# Patient Record
Sex: FEMALE | URBAN - METROPOLITAN AREA
[De-identification: names, ages, dates, MRNs, and addresses within clinical notes are randomized per-mention and may not be internally consistent; named-entity substitution may affect disease eponyms.]

---

## 2021-01-14 ENCOUNTER — APPOINTMENT (OUTPATIENT)
Dept: URBAN - METROPOLITAN AREA CLINIC 260 | Age: 51
Setting detail: DERMATOLOGY
End: 2021-01-15

## 2021-01-14 VITALS — WEIGHT: 148 LBS | RESPIRATION RATE: 15 BRPM | HEIGHT: 63 IN

## 2021-01-14 DIAGNOSIS — L81.1 CHLOASMA: ICD-10-CM

## 2021-01-14 PROCEDURE — 99202 OFFICE O/P NEW SF 15 MIN: CPT

## 2021-01-14 PROCEDURE — OTHER PRESCRIPTION: OTHER

## 2021-01-14 PROCEDURE — OTHER PRESCRIPTION MEDICATION MANAGEMENT: OTHER

## 2021-01-14 PROCEDURE — OTHER COUNSELING: OTHER

## 2021-01-14 RX ORDER — TRETIONIN 1 MG/G
CREAM TOPICAL QHS
Qty: 2 | Refills: 0 | COMMUNITY
Start: 2021-01-14

## 2021-01-14 RX ORDER — TRETINOIN 1 MG/G
0.1 % CREAM TOPICAL QHS
Qty: 2 | Refills: 0 | COMMUNITY
Start: 2021-01-14

## 2021-01-14 RX ORDER — HYDROQUINONE 4 %
4 % CREAM (GRAM) TOPICAL QAM
Qty: 2 | Refills: 0 | COMMUNITY
Start: 2021-01-14

## 2021-01-14 ASSESSMENT — LOCATION DETAILED DESCRIPTION DERM
LOCATION DETAILED: LEFT INFERIOR MEDIAL MALAR CHEEK
LOCATION DETAILED: LEFT INFERIOR CENTRAL MALAR CHEEK

## 2021-01-14 ASSESSMENT — LOCATION ZONE DERM: LOCATION ZONE: FACE

## 2021-01-14 ASSESSMENT — LOCATION SIMPLE DESCRIPTION DERM: LOCATION SIMPLE: LEFT CHEEK

## 2021-01-14 NOTE — PROCEDURE: COUNSELING
Patient Specific Counseling (Will Not Stick From Patient To Patient): She prefers the brand name of the medication tretinoin however she?s not sure if she can afford it. We will send both in as well as hydroquinone cream
Detail Level: Zone

## 2021-01-14 NOTE — PROCEDURE: PRESCRIPTION MEDICATION MANAGEMENT
Initiate Treatment: Hydroquinone cream qd\\nRetin a qd
Detail Level: Zone
Continue Regimen: Sunscreen
Render In Strict Bullet Format?: No

## 2021-07-23 ENCOUNTER — HOSPITAL ENCOUNTER (EMERGENCY)
Facility: CLINIC | Age: 51
Discharge: HOME | End: 2021-07-23
Attending: EMERGENCY MEDICINE | Admitting: EMERGENCY MEDICINE
Payer: COMMERCIAL

## 2021-07-23 VITALS
DIASTOLIC BLOOD PRESSURE: 55 MMHG | WEIGHT: 150 LBS | HEART RATE: 78 BPM | TEMPERATURE: 97.8 F | OXYGEN SATURATION: 100 % | SYSTOLIC BLOOD PRESSURE: 101 MMHG | RESPIRATION RATE: 18 BRPM

## 2021-07-23 DIAGNOSIS — L30.9 DERMATITIS: ICD-10-CM

## 2021-07-23 PROCEDURE — 250N000012 HC RX MED GY IP 250 OP 636 PS 637: Performed by: PHYSICIAN ASSISTANT

## 2021-07-23 PROCEDURE — 99283 EMERGENCY DEPT VISIT LOW MDM: CPT

## 2021-07-23 RX ORDER — PREDNISONE 20 MG/1
40 TABLET ORAL ONCE
Status: COMPLETED | OUTPATIENT
Start: 2021-07-23 | End: 2021-07-23

## 2021-07-23 RX ORDER — PREDNISONE 10 MG/1
TABLET ORAL
Qty: 30 TABLET | Refills: 0 | Status: SHIPPED | OUTPATIENT
Start: 2021-07-23 | End: 2021-08-04

## 2021-07-23 RX ADMIN — PREDNISONE 40 MG: 20 TABLET ORAL at 14:50

## 2021-07-23 ASSESSMENT — ENCOUNTER SYMPTOMS
NECK PAIN: 0
SORE THROAT: 0
SHORTNESS OF BREATH: 0
FREQUENCY: 0
NAUSEA: 0
CHILLS: 0
HEMATURIA: 0
FEVER: 0
WOUND: 0
COUGH: 0
BACK PAIN: 0
CHEST TIGHTNESS: 0
EYE DISCHARGE: 0
WEAKNESS: 0
VOMITING: 0
FACIAL SWELLING: 1
ABDOMINAL PAIN: 0
VOICE CHANGE: 1
TROUBLE SWALLOWING: 0
HEADACHES: 0
NUMBNESS: 0
DYSURIA: 0
DIARRHEA: 0

## 2021-07-23 NOTE — ED PROVIDER NOTES
EMERGENCY DEPARTMENT ENCOUNTER      NAME: Ilana Bishop  AGE: 50 year old female  YOB: 1970  MRN: [id number]  EVALUATION DATE & TIME: 7/23/2021  2:14 PM    PCP: No primary care provider on file.    ED PROVIDER: Missael Guerra PA-C      Chief Complaint   Patient presents with     Allergic Reaction         FINAL IMPRESSION:  1. Dermatitis          MEDICAL DECISION MAKING:    Pertinent Labs & Imaging studies reviewed. (See chart for details)  50 year old female presents to the Emergency Department for evaluation of complaints of faint rash on the face and pruritus noted on the face and hands, concerned about allergic reaction.    After obtaining history present illness, reviewing vital signs and examining the patient there is no current reports of lip swelling, tongue swelling, difficulty swallowing or difficulty breathing.  No generalized hives.  Nothing clinically that would support concern for acute anaphylaxis.  Patient is a nondiabetic.  I plan to treat her with a taper dose of prednisone and make a referral as an outpatient to our allergy clinics where she can have testing done to see what she is reacting to.  Overall patient comfortable with plan of care.  I did not feel there is any emergent indication for any continued monitoring or additional testing at this time.        ED COURSE  2:27 PM I met with the patient, obtained history, performed an initial exam, and discussed options and plan for diagnostics and treatment here in the ED. I discussed the plan for discharge with the patient, and patient is agreeable. We discussed supportive cares at home and reasons for return to the ER including new or worsening symptoms - all questions and concerns addressed. Patient to be discharged by RN.     At the conclusion of the encounter I discussed the results of all of the tests and the disposition. The questions were answered. The patient or family acknowledged understanding and was agreeable with the  care plan.     PPE: Provider wore gloves, N95 mask, eye protection, surgical cap, and paper mask.     MEDICATIONS GIVEN IN THE EMERGENCY:  Medications   predniSONE (DELTASONE) tablet 40 mg (40 mg Oral Given 7/23/21 1450)       NEW PRESCRIPTIONS STARTED AT TODAY'S ER VISIT  New Prescriptions    PREDNISONE (DELTASONE) 10 MG TABLET    Take 4 tablets (40 mg) by mouth daily for 3 days, THEN 3 tablets (30 mg) daily for 3 days, THEN 2 tablets (20 mg) daily for 3 days, THEN 1 tablet (10 mg) daily for 3 days.       =================================================================    HPI    Patient information was obtained from: Patient    Use of Interpretor: N/A         Ilana Bishop is a 50 year old female with no recorded pertinent medical history who presents to this ED via walk-in for evaluation of a rash and potential allergic reaction.     The patient reports facial swelling and a mild rash throughout her face, hands, and upper back to her neck that onset yesterday and has continued to worsen. She describes the rash as itchy and has been treating it with benadryl cream without relief. She also endorses a raspy voice but denies swelling of her throat. Patient notes that she used to have similar symptoms during the summer while living in [place] and states that she got a steroid injection that resolved her symptoms. Patient denies shortness of breath and any other symptoms or complaints at this time.       REVIEW OF SYSTEMS   Review of Systems   Constitutional: Negative for chills and fever.   HENT: Positive for facial swelling and voice change (raspy). Negative for congestion, sore throat and trouble swallowing.    Eyes: Negative for discharge and visual disturbance.   Respiratory: Negative for cough, chest tightness and shortness of breath.    Cardiovascular: Negative for chest pain and leg swelling.   Gastrointestinal: Negative for abdominal pain, diarrhea, nausea and vomiting.   Genitourinary: Negative for  dysuria, frequency, hematuria and urgency.   Musculoskeletal: Negative for back pain, gait problem and neck pain.   Skin: Positive for rash (face, hands, back; itchy). Negative for wound.   Neurological: Negative for weakness, numbness and headaches.   Psychiatric/Behavioral: Negative for behavioral problems and suicidal ideas.       PAST MEDICAL HISTORY:  No past medical history on file.    PAST SURGICAL HISTORY:  No past surgical history on file.      CURRENT MEDICATIONS:    No current facility-administered medications for this encounter.    Current Outpatient Medications:      predniSONE (DELTASONE) 10 MG tablet, Take 4 tablets (40 mg) by mouth daily for 3 days, THEN 3 tablets (30 mg) daily for 3 days, THEN 2 tablets (20 mg) daily for 3 days, THEN 1 tablet (10 mg) daily for 3 days., Disp: 30 tablet, Rfl: 0      ALLERGIES:  No Known Allergies    FAMILY HISTORY:  No family history on file.    SOCIAL HISTORY:   Social History     Socioeconomic History     Marital status: Not on file     Spouse name: Not on file     Number of children: Not on file     Years of education: Not on file     Highest education level: Not on file   Occupational History     Not on file   Tobacco Use     Smoking status: Not on file   Substance and Sexual Activity     Alcohol use: Not on file     Drug use: Not on file     Sexual activity: Not on file   Other Topics Concern     Not on file   Social History Narrative     Not on file     Social Determinants of Health     Financial Resource Strain:      Difficulty of Paying Living Expenses:    Food Insecurity:      Worried About Running Out of Food in the Last Year:      Ran Out of Food in the Last Year:    Transportation Needs:      Lack of Transportation (Medical):      Lack of Transportation (Non-Medical):    Physical Activity:      Days of Exercise per Week:      Minutes of Exercise per Session:    Stress:      Feeling of Stress :    Social Connections:      Frequency of Communication with  Friends and Family:      Frequency of Social Gatherings with Friends and Family:      Attends Congregation Services:      Active Member of Clubs or Organizations:      Attends Club or Organization Meetings:      Marital Status:    Intimate Partner Violence:      Fear of Current or Ex-Partner:      Emotionally Abused:      Physically Abused:      Sexually Abused:        VITALS:  Patient Vitals for the past 24 hrs:   BP Temp Temp src Pulse Resp SpO2 Weight   07/23/21 1446 101/55 -- -- 78 -- 100 % --   07/23/21 1445 -- -- -- 76 -- 100 % --   07/23/21 1430 112/61 -- -- 80 -- 97 % --   07/23/21 1413 110/61 97.8  F (36.6  C) Oral 86 18 100 % 68 kg (150 lb)       PHYSICAL EXAM    Physical Exam  Vitals and nursing note reviewed.   Constitutional:       General: She is not in acute distress.     Appearance: Normal appearance. She is not ill-appearing or toxic-appearing.   HENT:      Head: Normocephalic and atraumatic.      Right Ear: External ear normal.      Left Ear: External ear normal.      Nose: Nose normal. No congestion or rhinorrhea.      Mouth/Throat:      Mouth: Mucous membranes are moist.      Pharynx: Oropharynx is clear. No oropharyngeal exudate or posterior oropharyngeal erythema.      Comments: Uvula is midline, no visible swelling of the tongue or posterior pharynx  Eyes:      General: No scleral icterus.        Right eye: No discharge.         Left eye: No discharge.      Extraocular Movements: Extraocular movements intact.      Conjunctiva/sclera: Conjunctivae normal.   Cardiovascular:      Rate and Rhythm: Normal rate and regular rhythm.      Pulses: Normal pulses.      Heart sounds: Normal heart sounds. No murmur heard.     Pulmonary:      Effort: Pulmonary effort is normal. No respiratory distress.      Breath sounds: Normal breath sounds. No stridor. No wheezing, rhonchi or rales.   Chest:      Chest wall: No tenderness.   Abdominal:      General: Abdomen is flat. Bowel sounds are normal.      Palpations:  Abdomen is soft.   Musculoskeletal:         General: No swelling, tenderness, deformity or signs of injury. Normal range of motion.      Cervical back: Normal range of motion and neck supple. No rigidity or tenderness.   Lymphadenopathy:      Cervical: No cervical adenopathy.   Skin:     General: Skin is warm and dry.      Findings: Rash present. No bruising or erythema.      Comments: Scattered rash on face and hands that is pruritic in nature slightly erythematous.  No induration, no tenderness, no warmth.  No vesicles.   Neurological:      General: No focal deficit present.      Mental Status: She is alert and oriented to person, place, and time. Mental status is at baseline.      Cranial Nerves: No cranial nerve deficit.      Sensory: No sensory deficit.      Motor: No weakness.      Gait: Gait normal.   Psychiatric:         Mood and Affect: Mood normal.         Behavior: Behavior normal.         Judgment: Judgment normal.            I, Domi Arechiga, am serving as a scribe to document services personally performed by Missael Guerra PA-C based on my observation and the provider's statements to me. I, Missael Guerra PA-C attest that Domi Arechiga is acting in a scribe capacity, has observed my performance of the services and has documented them in accordance with my direction.    Missael Guerra PA-C  Emergency Medicine  [facility]      Missael Guerra PA-C  07/23/21 1517

## 2021-07-23 NOTE — ED TRIAGE NOTES
"Pt presents to the ED with c/o rash and possible allergic reaction that started around 1800 last night. Pt took benadryl without relief. Today pt says rash seems to mostly be on face/ hands. Unknown source of allergen. Denies any difficulty breathing. Pt states her throat feels more \"scratchy\"  today.   "

## 2021-07-23 NOTE — DISCHARGE INSTRUCTIONS
Please take steroids as written.  Follow-up as an outpatient through allergy clinic.  Return to the ED if there is new or worsening symptoms.

## 2023-01-11 ENCOUNTER — PATIENT OUTREACH (OUTPATIENT)
Dept: ONCOLOGY | Facility: CLINIC | Age: 53
End: 2023-01-11
Payer: COMMERCIAL

## 2023-01-11 ENCOUNTER — TRANSCRIBE ORDERS (OUTPATIENT)
Dept: OTHER | Age: 53
End: 2023-01-11

## 2023-01-11 DIAGNOSIS — C50.919 BREAST CANCER (H): Primary | ICD-10-CM

## 2023-01-11 NOTE — PROGRESS NOTES
New Patient Oncology Nurse Navigator Note     Referring provider: Dr Javier Renteria      Referring Clinic/Organization: [facility]     Referred to (specialty:) Medical Oncology      Date Referral Received: January 11, 2023     Evaluation for:  Breast cancer     Clinical History (per Nurse review of records provided):       Ilana had a screening mammogram on 04/26/2022 showed negative. Patient started to feel some left breast pain/discomfort and she reported that her primary care physician who ordered another mammogram done on 11/04/2022 which demonstrated scattered areas of fibro glandular density without any masses however targeted ultrasound in the area of palpable concern as indicated by the patient at the 3 o'clock left breast 10 cm from the nipple demonstrates an suspicious irregular, hypoechoic mass measuring 1.5 x 1.2 x 1.3 cm. This was not visualized on the mammogram likely due to its far lateral and posterior position.     11/4/22 -   A) LEFT BREAST, 3:00, 10 CM FROM NIPPLE, ULTRASOUND-GUIDED CORE BIOPSY:  1. Invasive ductal carcinoma  a. Karlos grade: III of III; Wayne score: 9 of 9  b. Angio-lymphatic invasion: Absent  c. Associated DCIS: Present, focally  d. Subtype: Solid  e. Grade of DCIS: 3 of 3  2. Breast Ancillary Testing:  a. Hormone Receptors:  Estrogen receptor: Positive (66%, weak staining)  Progesterone receptor: Negative  b. HER2 by IHC: Negative (0 by manual morphometry)  c. Ki-67: 44% by image analysis    Patient underwent left partial mastectomy by Dr. Villar on 12/16/2022 at [facility].   12/16/22 -   A.  Breast, left, Left Breast tissue short stitch superior long stitch lateral, fresh for margins, has seed, partial mastectomy:    Infiltrating ductal carcinoma, Wayne grade III, 1.5 x 1.5 by 1.4 cm in greatest dimension.    Ductal carcinoma in Situ (DCIS), solid, high nuclear grade.    Previous biopsy site changes, excised    Margins negative but close  posterior (less than 1 mm from invasive tumor)    See tumor synoptic report below     B.  Lymph nodes, sentinel, Left Axillary [place] Lymph Node, biopsy:    Three lymph nodes negative for metastatic tumor (0/3).     C.  Breast, left, New Left Breast Posterior Margin stitch marks new margin, re-excision:    Negative for malignancy, skeletal muscle, connective tissue and adipose     D.  Breast, left, New Left Breast Superior Margin stitch marks new margin, re-excision:    Negative for atypia and malignancy.      Focal proliferative fibrocystic change     E.  Breast, left, New Left Breast Medial Margin stitch marks new margin, reexcision:    Negative for atypia and malignancy     F.  Breast, left, New Left Breast Lateral Margin stitch marks new margin, re-excision:    Negative for atypia and malignancy     G.  Breast, left, New Left Breast Anterior Margin, re-excision:  Skin and soft tissue negative for atypia and malignancy    Malignant neoplasm of left breast in female, estrogen receptor positive, unspecified site of breast (HRC)  Microscopic Description    Hormone Receptor Analysis by Immunohistochemistry    Estrogen Receptor (ER) Status: Negative (<1%)           Internal control cells present and stain as expected   Progesterone Receptor (IN): Negative (<1%)           Internal control cells present and stain as expected   HER2 by Immunohistochemistry  INTERPRETATION: Negative (score: 1+)  Specimen Block Number: A6    Note discrepancy on hormone receptors from core biopsy to surgery specimen.      She was seen on 12/29/22 at [facility] by Javier Renteria MD. At that time the plan was for [facility] to obtain core biopsy slides from [facility] for pathology review and for both core and surgery specimen slides to be sent to [facility] for review.      IT IS UNKNOWN WHERE SLIDES ARE AT TIME OF THIS REFERRAL.     Patient was originally planning on [place] for a second opinion, but her insurance did not cover [place], so  referral to [facility] placed today.    Records Location: Care Everywhere, Media and See Bookmarked material     Records Needed:   Breast imaging for past 5 years  Pathology review     1/11 1305 - Telephoned and spoke with Ilana. She would like to be seen by a breast expert at the [facility] but does not want to be seen until after the pathology review is complete to help clarify the discrepancy between core and surgical specimen estrogen receptors.  Writer explained the specimen will need to be requested from [facility] once it has returned from [facility], or [place], depending upon where [facility] sent it after they learned her insurance was not covered there. We scheduled her to see Dr. Alonso on 1/24, but writer to watch for path review to be complete and move consult if we do not have the final path review completed.

## 2023-01-18 NOTE — TELEPHONE ENCOUNTER
RECORDS STATUS - BREAST    Action    Action Taken 23  URGENTLY REQUESTED SLIDES FROM [facility] WITH SPECIFIC INSTRUCTIONS TO HAVE SLIDES SENT DIRECTLY TO OUR PATH LAB    Spoke w/ [facility] Radiology File Room ph: [phone], fax: [phone] confirmed they have imaging for pt, faxed urgent request. They advised they would do their best to expedite sending disc.    -Spoke w/ Shannon @ [facility] Pathology, confirmed receipt of request. Advised slides were on site. Requested if slides could be expedited, Shannon advised that the slides should go out today.    LVM @ [facility] re: requesting slides be expedited.     IB to Aysha SHERWOOD Re: above  9:03 AM    23  Imaging disc received from [facility], taken for upload.     Email sent to  PACS team requesting resolution of breast imaging.     11:40 AM    Pathology slides from [facility] were sent together from [facility], as they had them for a 2nd read.     Spoke / [facility] Consult Office: [phone] opt 2 - confirmed receipt of both sets of slides as received & that they were currently with their pathologist. IB to Aysha to update.   12:18 PM       [facility]   DATE REQUESTED:    NOTES DETAILS STATUS   OFFICE NOTE from referring provider DAHLIA - KEL Renteria   OFFICE NOTE from medical oncologist CE -  Dr. Renteria: 22   OFFICE NOTE from surgeon DAHLIA -  Dr. Roxanna Villar: 22   OPERATIVE REPORT  -  22: Mastectomy   LABS     PATHOLOGY REPORTS  (Tissue diagnosis, Stage, ER/NJ percentage positive and intensity of staining, HER2 IHC, FISH, and all biopsies from breast and any distant metastasis)                 [facility] [employer]    [facility] [employer] [facility]  22: [id number]    [facility]  22: [id number]   IMAGING (NEED IMAGES & REPORT)  [facility] [employer]   MRI Requested  - received  22:    MAMMO Requested  1/18 - received  [facility]  12/16/22    [facility]  11/4/22, 4/26/22    [facility]  12/14/18, 3/5/15   ULTRASOUND Requested 1/18 - received  [facility]  11/4/22

## 2023-01-19 ENCOUNTER — LAB REQUISITION (OUTPATIENT)
Dept: LAB | Facility: CLINIC | Age: 53
End: 2023-01-19
Payer: COMMERCIAL

## 2023-01-19 PROCEDURE — 88360 TUMOR IMMUNOHISTOCHEM/MANUAL: CPT | Mod: TC | Performed by: INTERNAL MEDICINE

## 2023-01-19 PROCEDURE — 88321 CONSLTJ&REPRT SLD PREP ELSWR: CPT | Performed by: PATHOLOGY

## 2023-01-19 PROCEDURE — 88360 TUMOR IMMUNOHISTOCHEM/MANUAL: CPT | Mod: 26 | Performed by: PATHOLOGY

## 2023-01-19 PROCEDURE — 88321 CONSLTJ&REPRT SLD PREP ELSWR: CPT | Mod: XS | Performed by: PATHOLOGY

## 2023-01-20 NOTE — PROGRESS NOTES
Telephoned and spoke with Ilana to inform her part of the pathology review has been completed and the other is in process.  As patient requested, this is needed prior to medical oncology consult.   She is currently scheduled on 1/24 with Dr. Alonso and knows writer will be watching for this results and if not available, we will move appointment. Aysha Penny RN

## 2023-01-24 ENCOUNTER — ONCOLOGY VISIT (OUTPATIENT)
Dept: ONCOLOGY | Facility: CLINIC | Age: 53
End: 2023-01-24
Attending: INTERNAL MEDICINE
Payer: COMMERCIAL

## 2023-01-24 ENCOUNTER — PRE VISIT (OUTPATIENT)
Dept: ONCOLOGY | Facility: CLINIC | Age: 53
End: 2023-01-24
Payer: COMMERCIAL

## 2023-01-24 VITALS
WEIGHT: 157.6 LBS | RESPIRATION RATE: 17 BRPM | HEIGHT: 64 IN | BODY MASS INDEX: 26.91 KG/M2 | DIASTOLIC BLOOD PRESSURE: 75 MMHG | SYSTOLIC BLOOD PRESSURE: 120 MMHG | HEART RATE: 77 BPM | OXYGEN SATURATION: 99 % | TEMPERATURE: 98 F

## 2023-01-24 DIAGNOSIS — C50.812 MALIGNANT NEOPLASM OF OVERLAPPING SITES OF LEFT BREAST IN FEMALE, ESTROGEN RECEPTOR POSITIVE (H): ICD-10-CM

## 2023-01-24 DIAGNOSIS — C50.919 BREAST CANCER (H): ICD-10-CM

## 2023-01-24 DIAGNOSIS — Z17.0 MALIGNANT NEOPLASM OF OVERLAPPING SITES OF LEFT BREAST IN FEMALE, ESTROGEN RECEPTOR POSITIVE (H): ICD-10-CM

## 2023-01-24 PROCEDURE — 99417 PROLNG OP E/M EACH 15 MIN: CPT | Performed by: INTERNAL MEDICINE

## 2023-01-24 PROCEDURE — 99205 OFFICE O/P NEW HI 60 MIN: CPT | Performed by: INTERNAL MEDICINE

## 2023-01-24 PROCEDURE — G0463 HOSPITAL OUTPT CLINIC VISIT: HCPCS

## 2023-01-24 PROCEDURE — G0463 HOSPITAL OUTPT CLINIC VISIT: HCPCS | Performed by: INTERNAL MEDICINE

## 2023-01-24 RX ORDER — VITAMIN B COMPLEX
1 CAPSULE ORAL DAILY
COMMUNITY
Start: 2022-04-05

## 2023-01-24 RX ORDER — MULTIVITAMIN
1 TABLET ORAL DAILY
COMMUNITY
Start: 2022-04-05

## 2023-01-24 ASSESSMENT — PAIN SCALES - GENERAL: PAINLEVEL: NO PAIN (0)

## 2023-01-24 NOTE — LETTER
1/24/2023         RE: Ilana Bishop  [id number]  Count [place] the [facility] [address]        Dear Colleague,    Thank you for referring your patient, Ilana Bishop, to the [facility]. Please see a copy of my visit note below.    [facility]  Hematology/Oncology Consultation    Ilana Bishpo MRN# [id number]   Age: 52 year old YOB: 1970       CC: Breast Cancer      HPI: Ilana Bishop is a 52 year old perimenopausal woman with recent diagnosis of palpation detected cT1c N0 ER (weakly positive), KY/HER2 negative IDC of the left breast.  She underwent partial mastectomy with SLNB demonstrating pT1cN0 IDC with ER, KY, HER2 negative malignancy.  Due to discrepancy between her biopsy and final pathology results, she presents today for second opinion.     Her full oncologic history is as follows:   Oncologic History  Patient Active Problem List    Diagnosis Date Noted     Malignant neoplasm of overlapping sites of left breast in female, estrogen receptor positive (H) 11/18/2022     Priority: Medium     Left breast cancer  02/2022 left breast pain  4/26/2022 screening mammogram [facility]  10/2022 Continued to have pain in her left breast and left breast mass  11/04/2022 diagnostic mammogram of the left breast demonstrated scattered areas of fibroglandular density.  No suspicious mass or calcs were seen.  On ultrasound at 3:00 10 cm from the nipple, there was a 1.5 x 1.2 x 1.3 cm hypoechoic mass.  11/4/2022 ultrasound-guided biopsy ([facility]) grade 3 invasive ductal carcinoma.  ER(1+, 66%), PR0, HER2 0, Ki-67 44%.  Also grade 3 DCIS. N review showing grade 3 IDC, focal DCIS.  ER(1+, 20%), KY 0, HER2 1+, Ki-67 40%  12/1/2022 MRI breast no abnormalities in the right breast or right axilla.  Irregular heterogeneously enhancing mass in the left breast abutting the chest wall, measuring 1.8 x 1.5 x 1.2 cm.  Associated superficial chest wall enhancement.  No enlarged lymph nodes  identified in the left axilla.  No internal mammary lymphadenopathy.  2022 left breast partial mastectomy and SLNB ([facility]) 1.5cm of grade 3 IDC.  No LVI.  Margins negative.  0/3 lymph nodes with carcinoma.  ER 0, WV 0, HER2 0. pT1cN0 3 mm of high-grade DCIS.   UMN review 1.5 cm grade 3 IDC, high-grade DCIS.  0 out of 3 lymph nodes with carcinoma.  Negative margins.  ER 0, WV 0, HER2 1+.  pT1cN0.  Due to discrepancy in estrogen receptor expression by IHC between biopsy and surgical specimen, repeat biomarker testing being performed in-house.  Currently pending.          Interval History  Ilana is overall doing well but is understandably concerned regarding the discrepancy in the estrogen receptor between her biopsy and final pathology.  She is recovering well post surgery and has no concerns.    She denies headaches, visual changes, cough, SOB, chest pain, n/v, constipation/diarrhea, abdominal pain, focal weakness or numbness.       Past Medical History  Past Medical History:   Diagnosis Date     Breast cancer (H)        Past Surgical History  Past Surgical History:   Procedure Laterality Date     AS MASTECTOMY, PARTIAL Left 2022       Family History  History reviewed. No pertinent family history.       Social History  Social History     Tobacco Use     Smoking status: Never     Smokeless tobacco: Never   Substance Use Topics     Alcohol use: Not Currently     Drug use: Never      Social History     Social History Narrative    Menarche - early teens    Currently perimenopausal     First at age 19. Breast fed all children about 8 months    OCPs for a few months    No HRT use    Works as a  and currently owns her own business        Current Medications:  Current Outpatient Medications   Medication Sig Dispense Refill     cholecalciferol (VITAMIN D3) 25 mcg (1000 units) capsule Take 1,000 Units by mouth       Multiple Vitamin (ONE-A-DAY ESSENTIAL) TABS Take 1 tablet by mouth daily    "    vitamin (B COMPLEX) capsule Take 1 capsule by mouth daily           ECOG Performance Status: 0    Physical Examination:  /75   Pulse 77   Temp 98  F (36.7  C) (Oral)   Resp 17   Ht 1.618 m (5' 3.7\")   Wt 71.5 kg (157 lb 9.6 oz)   LMP 12/07/2022   SpO2 99%   BMI 27.31 kg/m    General:  Well appearing, well-nourished adult female in NAD.  HEENT:  Normocephalic.  Sclera anicteric.  MMM.  No lesions of the oropharynx.  Breast: Tenderness to palpation in left breast, but no palpable masses  Lymph:  No cervical, supraclavicular, or axillary LAD.  Chest:  CTA bilaterally.  No wheezes or crackles.  CV:  RRR.  No murmurs or gallops  Abd:  Soft/NT/ND.  BS normoactive.  No hepatosplenomegaly.  Ext:  No pitting edema of the bilateral lower extremities.  Pulses 2+ and symmetric.  Musculo:  Strength 5/5 throughout.  Neuro:  Cranial nerves grossly intact.  Psych:  Mood and affect appear normal.      Laboratory Data:  No results found for: WBC, HGB, HCT, MCV, PLT  No results found for: NA, HCO3, CREATININE, BUN, ANIONGAP, GLUCOSE  No results found for: ALT, AST, GGT, ALKPHOS, BILITOT  No results found for: INR, PT      Radiology data:  No results found.  I have personally reviewed the images of / or the following radiology data: Per oncology timeline    Pathology and other data:  I have personally reviewed the following data: Per oncology timeline      Assessment and Recommendations  Ilana Bishop  is a 52 year old perimenopausal woman with a recent diagnosis of palpation detected cT1cN0 ER(weakly positive), WI/HER2 negative IDC of the left breast.  She underwent partial mastectomy with SLNB demonstrating pT1cN0 IDC with ER, WI, HER2 negative malignancy.  Due to discrepancy between her biopsy and final pathology results, she presents today for second opinion.     We had a lengthy discussion with the patient in which we reviewed her breast cancer diagnosis, workup and treatment history to date. We reviewed all of " the relevant and available clinic notes,imaging, and pathology reports. We discussed the use of locoregional therapies to reduce risk of locoregional disease and the use of systemic therapies to primarily reduce risk of systemic disease. Informed her that locoregional breast cancer is curable and that the goal would be to reduced her risk of developing systemic disease, which can be incurable.    Based on our pathologists review of her outside pathology, the biopsy done at [facility] is only weekly ER positive at 20% and the surgical specimen from [facility] is triple negative. We discussed heterogeneity of cancer cells at length and that weekly ER+ cancers tend to behave more like triple negative cancers. Although her cancer was early stage, I am concerned that she would have a high risk of recurrence based on the biology of her cancer. Discussed that our pathology team will repeat biomarker testing again given the discrepancy of the results she has so far.     I would recommend that she have adjuvant chemotherapy, even if ER is weakly positive. Ilana would like to avoid chemotherapy if at all possible. I discussed that mammaprint/blueprint testing can help us identify molecular features of her cancer, which I suspect is likely basal. She is agreeable to having this test done. If she declines systemic chemotherapy, one can consider adjuvant hormone therapy, though the benefit from this will likely be small. Since she only had a lumpectomy, she should also proceed with adjuvant RT.     In terms on long term monitoring, one can consider alternativing annual Dx mammo and MRI so that left breast is imaged q6months. We discussed why systemic imaging or blood work is not recommended for ongoing monitoring/screening of early stage breast cancers.     Finally, in light of her breast cancer diagnosis and lack of identifiable risk factors, I recommend that she undergo germline genetic testing. This may at times guide our treatment  decisions and be helpful information for her family members to have.        RECOMMENDATIONS  - Repeat in-house biomarker testing - currently pending  - Mammaprint/blueprint testing on ER+ sample if enough tissue is available  - Germline genetic testing  - Adjuvant chemotherapy   - Can consider alternative annual Dx mammo and MRI so that left breast is imaged q6months. No systemic imaging or blood work recommended for ongoing monitoring/screening         ADDENDUM 1/27/2023  Our pathologists were able to repeat biomarker testing on both the biopsy and surgical specimen. The biopsy specimen is read as ER (1+, 21-30%), MN (<1%), HER2 1+ on IHC. The surgical specimen was ER(<1%), MN(<1%), HER2 1+ on IHC.. I called and reviewed these results with Ilana again today. Reviewed that tumors can be heterogenous, but that weakly ER+ disease biologically behaves more like TNBC. I continue to recommend adjuvant chemotherapy. Molecular testing on the biopsy specimen (mammaprint with blueprint) can be considered to help provide more information for the patient regarding the biology of her disease. I suspect the mammaprint will be highrisk and the blueprint will be basal or luminal B subtype based on all of the clinical/histologic features available currently. She would prefer that additional testing be do by her primary oncologist. I will have our team fax the biopsy review over to them. All questions were answered for the patient. I am happy to see her at any time should additional questions arise.       Biopsy  Final Diagnosis   CASE FROM [place], [place] ([id number], OBTAINED 11/04/2022):  LEFT BREAST, 3:00, 10 CM FROM NIPPLE, ULTRASOUND-GUIDED CORE BIOPSY:  -INVASIVE DUCTAL CARCINOMA, [place] grade 3, at least 9 mm in linear extent.  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  -Invasive carcinoma is estrogen receptor positive (approx. 20% nuclei, weak staining), and negative for  progesterone receptor and HER2 gene amplification by immunohistochemistry (score 1+).  -Ki-67 proliferation index is approximately 40% overall.   Addendum electronically signed by Luis Blackburn MD on 1/27/2023 at 12:35 PM   Electronically signed by Luis Blackburn MD on 1/23/2023 at  9:06 AM   Original Case ID     [id number]   Material Submitted     8 slides   Synoptic Checklist   Breast Biomarker Reporting Template  Protocol posted: 6/22/2022  (Added in Addendum) BREAST: BIOMARKER REPORTING TEMPLATE - All Specimens  Test(s) Performed     Estrogen Receptor (ER) Status  Positive (greater than 10% of cells demonstrate nuclear positivity)    Percentage of Cells with Nuclear Positivity  21-30%    Average Intensity of Staining  Weak    Test Type  Food and Drug Administration (FDA) cleared (test / vendor): [employer]    Primary Antibody  SP1    Scoring System  No separate scoring system used    Test(s) Performed     Progesterone Receptor (PgR) Status  Negative (less than 1%)      Internal control cells present and stain as expected    Test Type  Food and Drug Administration (FDA) cleared (test / vendor): [employer]    Primary Antibody  1E2    Test(s) Performed     HER2 by Immunohistochemistry  Negative (Score 1+)    Test Type  Food and Drug Administration (FDA) cleared (test / vendor): [employer]    Primary Antibody  4B5    Cold Ischemia and Fixation Times  Cannot be determined: outside block    Testing Performed on Block Number(s)  outside block A1    METHODS   Fixative  Formalin    Image Analysis  Not performed           Surgical specimen  Final Diagnosis   CASE FROM [place], [place] ([id number], OBTAINED 12/16/2022):  A. LEFT BREAST, PARTIAL MASTECTOMY:  - INVASIVE DUCTAL CARCINOMA, [place] grade 3, 1.5 cm in greatest dimension.  - Ductal carcinoma in-situ (DCIS), high nuclear grade, solid type.  - Margins are negative (see also part C-G for final margins).  - Invasive carcinoma is estrogen  receptor negative, progesterone receptor negative and HER2/liyah negative/non-amplified (score 1+) by immunohistochemistry.  - AJCC pathologic staging is pT1c N0(sn)  - See outside synoptic report for details.     B. SENTINEL LYMPH NODE, LEFT AXILLARY, EXCISION:  - Three lymph nodes, negative for malignancy (0/3).     C. LEFT BREAST, POSTERIOR MARGIN, RE-EXCISION:  - Negative for malignancy.     D. LEFT BREAST, SUPERIOR MARGIN, RE-EXCISION:  - Negative for malignancy.     E. LEFT BREAST, MEDIAL MARGIN, RE-EXCISION:  - Negative for malignancy.     F. LEFT BREAST, LATERAL MARGIN, RE-EXCISION:         - Negative for malignancy.     G. LEFT BREAST, ANTERIOR MARGIN, RE-EXCISION:  - Negative for malignancy.   Addendum electronically signed by Luis Blackburn MD on 1/26/2023 at  6:59 AM   Electronically signed by Luis Blackburn MD on 1/20/2023 at 12:01 PM   Original Case ID   [id number]   [id number]   Material Submitted   [facility]   38 slides   Synoptic Checklist   Breast Biomarker Reporting Template  Protocol posted: 6/22/2022  (Added in Addendum) BREAST: BIOMARKER REPORTING TEMPLATE - All Specimens  Test(s) Performed     Estrogen Receptor (ER) Status  Negative (less than 1%)      Internal control cells present and stain as expected    Test Type  Food and Drug Administration (FDA) cleared (test / vendor): [place]    Primary Antibody  SP1    Scoring System  No separate scoring system used    Test(s) Performed     Progesterone Receptor (PgR) Status  Negative (less than 1%)      Internal control cells present and stain as expected    Test Type  Food and Drug Administration (FDA) cleared (test / vendor): [place]    Primary Antibody  1E2    Scoring System  No separate scoring system used    Test(s) Performed     HER2 by Immunohistochemistry  Negative (Score 1+)    Test Type  Food and Drug Administration (FDA) cleared (test / vendor): [place]    Primary Antibody  4B5    Cold Ischemia and Fixation Times  Cannot be  determined: outside case    Testing Performed on Block Number(s)  outside block A6    METHODS   Fixative  Formalin    Image Analysis  Not performed    .            90 minutes spent on the date of the encounter doing chart review, review of outside records, review of test results, interpretation of tests, patient visit, documentation and discussion with other provider(s)       Dame Celeste MD   of Medicine  Division of Hematology, Oncology and Transplantation  [facility]                  Again, thank you for allowing me to participate in the care of your patient.        Sincerely,        Dame Celeste MD

## 2023-01-24 NOTE — LETTER
1/24/2023         RE: Ilana Bishop  [id number]  [facility] [address]        Dear Colleague,    Thank you for referring your patient, Ilana Bishop, to the [facility]. Please see a copy of my visit note below.    [facility]  Hematology/Oncology Consultation    Ilana Bishop MRN# [id number]   Age: 52 year old YOB: 1970       CC: Breast Cancer      HPI: Ilana Bishop is a 52 year old perimenopausal woman with recent diagnosis of palpation detected cT1c N0 ER (weakly positive), CO/HER2 negative IDC of the left breast.  She underwent partial mastectomy with SLNB demonstrating pT1cN0 IDC with ER, CO, HER2 negative malignancy.  Due to discrepancy between her biopsy and final pathology results, she presents today for second opinion.     Her full oncologic history is as follows:   Oncologic History  Patient Active Problem List    Diagnosis Date Noted     Malignant neoplasm of overlapping sites of left breast in female, estrogen receptor positive (H) 11/18/2022     Priority: Medium     Left breast cancer  02/2022 left breast pain  4/26/2022 screening mammogram [facility]  10/2022 Continued to have pain in her left breast and left breast mass  11/04/2022 diagnostic mammogram of the left breast demonstrated scattered areas of fibroglandular density.  No suspicious mass or calcs were seen.  On ultrasound at 3:00 10 cm from the nipple, there was a 1.5 x 1.2 x 1.3 cm hypoechoic mass.  11/4/2022 ultrasound-guided biopsy ([facility]) grade 3 invasive ductal carcinoma.  ER(1+, 66%), PR0, HER2 0, Ki-67 44%.  Also grade 3 DCIS. N review showing grade 3 IDC, focal DCIS.  ER(1+, 20%), CO 0, HER2 1+, Ki-67 40%  12/1/2022 MRI breast no abnormalities in the right breast or right axilla.  Irregular heterogeneously enhancing mass in the left breast abutting the chest wall, measuring 1.8 x 1.5 x 1.2 cm.  Associated superficial chest wall enhancement.  No enlarged lymph nodes  identified in the left axilla.  No internal mammary lymphadenopathy.  2022 left breast partial mastectomy and SLNB ([facility]) 1.5cm of grade 3 IDC.  No LVI.  Margins negative.  0/3 lymph nodes with carcinoma.  ER 0, OR 0, HER2 0. pT1cN0 3 mm of high-grade DCIS.   UMN review 1.5 cm grade 3 IDC, high-grade DCIS.  0 out of 3 lymph nodes with carcinoma.  Negative margins.  ER 0, OR 0, HER2 1+.  pT1cN0.  Due to discrepancy in estrogen receptor expression by IHC between biopsy and surgical specimen, repeat biomarker testing being performed in-house.  Currently pending.          Interval History  Ilana is overall doing well but is understandably concerned regarding the discrepancy in the estrogen receptor between her biopsy and final pathology.  She is recovering well post surgery and has no concerns.    She denies headaches, visual changes, cough, SOB, chest pain, n/v, constipation/diarrhea, abdominal pain, focal weakness or numbness.       Past Medical History  Past Medical History:   Diagnosis Date     Breast cancer (H)        Past Surgical History  Past Surgical History:   Procedure Laterality Date     AS MASTECTOMY, PARTIAL Left 2022       Family History  History reviewed. No pertinent family history.       Social History  Social History     Tobacco Use     Smoking status: Never     Smokeless tobacco: Never   Substance Use Topics     Alcohol use: Not Currently     Drug use: Never      Social History     Social History Narrative    Menarche - early teens    Currently perimenopausal     First at age 19. Breast fed all children about 8 months    OCPs for a few months    No HRT use    Works as a  and currently owns her own business        Current Medications:  Current Outpatient Medications   Medication Sig Dispense Refill     cholecalciferol (VITAMIN D3) 25 mcg (1000 units) capsule Take 1,000 Units by mouth       Multiple Vitamin (ONE-A-DAY ESSENTIAL) TABS Take 1 tablet by mouth daily    "    vitamin (B COMPLEX) capsule Take 1 capsule by mouth daily           ECOG Performance Status: 0    Physical Examination:  /75   Pulse 77   Temp 98  F (36.7  C) (Oral)   Resp 17   Ht 1.618 m (5' 3.7\")   Wt 71.5 kg (157 lb 9.6 oz)   LMP 12/07/2022   SpO2 99%   BMI 27.31 kg/m    General:  Well appearing, well-nourished adult female in NAD.  HEENT:  Normocephalic.  Sclera anicteric.  MMM.  No lesions of the oropharynx.  Breast: Tenderness to palpation in left breast, but no palpable masses  Lymph:  No cervical, supraclavicular, or axillary LAD.  Chest:  CTA bilaterally.  No wheezes or crackles.  CV:  RRR.  No murmurs or gallops  Abd:  Soft/NT/ND.  BS normoactive.  No hepatosplenomegaly.  Ext:  No pitting edema of the bilateral lower extremities.  Pulses 2+ and symmetric.  Musculo:  Strength 5/5 throughout.  Neuro:  Cranial nerves grossly intact.  Psych:  Mood and affect appear normal.      Laboratory Data:  No results found for: WBC, HGB, HCT, MCV, PLT  No results found for: NA, HCO3, CREATININE, BUN, ANIONGAP, GLUCOSE  No results found for: ALT, AST, GGT, ALKPHOS, BILITOT  No results found for: INR, PT      Radiology data:  No results found.  I have personally reviewed the images of / or the following radiology data: Per oncology timeline    Pathology and other data:  I have personally reviewed the following data: Per oncology timeline      Assessment and Recommendations  Ilana Bishop  is a 52 year old perimenopausal woman with a recent diagnosis of palpation detected cT1cN0 ER(weakly positive), RI/HER2 negative IDC of the left breast.  She underwent partial mastectomy with SLNB demonstrating pT1cN0 IDC with ER, RI, HER2 negative malignancy.  Due to discrepancy between her biopsy and final pathology results, she presents today for second opinion.     We had a lengthy discussion with the patient in which we reviewed her breast cancer diagnosis, workup and treatment history to date. We reviewed all of " the relevant and available clinic notes,imaging, and pathology reports. We discussed the use of locoregional therapies to reduce risk of locoregional disease and the use of systemic therapies to primarily reduce risk of systemic disease. Informed her that locoregional breast cancer is curable and that the goal would be to reduced her risk of developing systemic disease, which can be incurable.    Based on our pathologists review of her outside pathology, the biopsy done at [facility] is only weekly ER positive at 20% and the surgical specimen from [facility] is triple negative. We discussed heterogeneity of cancer cells at length and that weekly ER+ cancers tend to behave more like triple negative cancers. Although her cancer was early stage, I am concerned that she would have a high risk of recurrence based on the biology of her cancer. Discussed that our pathology team will repeat biomarker testing again given the discrepancy of the results she has so far.     I would recommend that she have adjuvant chemotherapy, even if ER is weakly positive. Ilana would like to avoid chemotherapy if at all possible. I discussed that mammaprint/blueprint testing can help us identify molecular features of her cancer, which I suspect is likely basal. She is agreeable to having this test done. If she declines systemic chemotherapy, one can consider adjuvant hormone therapy, though the benefit from this will likely be small. Since she only had a lumpectomy, she should also proceed with adjuvant RT.     In terms on long term monitoring, one can consider alternativing annual Dx mammo and MRI so that left breast is imaged q6months. We discussed why systemic imaging or blood work is not recommended for ongoing monitoring/screening of early stage breast cancers.     Finally, in light of her breast cancer diagnosis and lack of identifiable risk factors, I recommend that she undergo germline genetic testing. This may at times guide our treatment  decisions and be helpful information for her family members to have.        RECOMMENDATIONS  - Repeat in-house biomarker testing - currently pending  - Mammaprint/blueprint testing on ER+ sample if enough tissue is available  - Germline genetic testing  - Adjuvant chemotherapy   - Can consider alternative annual Dx mammo and MRI so that left breast is imaged q6months. No systemic imaging or blood work recommended for ongoing monitoring/screening         90 minutes spent on the date of the encounter doing chart review, review of outside records, review of test results, interpretation of tests, patient visit, documentation and discussion with other provider(s)       Dame Celeste MD   of Medicine  Division of Hematology, Oncology and Transplantation  [facility]                  Again, thank you for allowing me to participate in the care of your patient.        Sincerely,        Dame Celeste MD

## 2023-01-24 NOTE — PROGRESS NOTES
[facility]  Hematology/Oncology Consultation    Ilana Bishop MRN# [id number]   Age: 52 year old YOB: 1970       CC: Breast Cancer      HPI: Ilana Bishop is a 52 year old perimenopausal woman with recent diagnosis of palpation detected cT1c N0 ER (weakly positive), [place]/HER2 negative IDC of the left breast.  She underwent partial mastectomy with SLNB demonstrating pT1cN0 IDC with ER, [place], HER2 negative malignancy.  Due to discrepancy between her biopsy and final pathology results, she presents today for second opinion.     Her full oncologic history is as follows:   Oncologic History  Patient Active Problem List    Diagnosis Date Noted     Malignant neoplasm of overlapping sites of left breast in female, estrogen receptor positive (H) 11/18/2022     Priority: Medium     Left breast cancer  02/2022 left breast pain  4/26/2022 screening mammogram [facility]  10/2022 Continued to have pain in her left breast and left breast mass  11/04/2022 diagnostic mammogram of the left breast demonstrated scattered areas of fibroglandular density.  No suspicious mass or calcs were seen.  On ultrasound at 3:00 10 cm from the nipple, there was a 1.5 x 1.2 x 1.3 cm hypoechoic mass.  11/4/2022 ultrasound-guided biopsy ([facility]) grade 3 invasive ductal carcinoma.  ER(1+, 66%), PR0, HER2 0, Ki-67 44%.  Also grade 3 DCIS. N review showing grade 3 IDC, focal DCIS.  ER(1+, 20%), KS 0, HER2 1+, Ki-67 40%  12/1/2022 MRI breast no abnormalities in the right breast or right axilla.  Irregular heterogeneously enhancing mass in the left breast abutting the chest wall, measuring 1.8 x 1.5 x 1.2 cm.  Associated superficial chest wall enhancement.  No enlarged lymph nodes identified in the left axilla.  No internal mammary lymphadenopathy.  12/16/2022 left breast partial mastectomy and SLNB ([facility]) 1.5cm of grade 3 IDC.  No LVI.  Margins negative.  0/3 lymph nodes with carcinoma.  ER 0, KS 0, HER2 0. pT1cN0 3  "mm of high-grade DCIS.   UMN review 1.5 cm grade 3 IDC, high-grade DCIS.  0 out of 3 lymph nodes with carcinoma.  Negative margins.  ER 0, SD 0, HER2 1+.  pT1cN0.  Due to discrepancy in estrogen receptor expression by IHC between biopsy and surgical specimen, repeat biomarker testing being performed in-house.  Currently pending.          Interval History  Ilana is overall doing well but is understandably concerned regarding the discrepancy in the estrogen receptor between her biopsy and final pathology.  She is recovering well post surgery and has no concerns.    She denies headaches, visual changes, cough, SOB, chest pain, n/v, constipation/diarrhea, abdominal pain, focal weakness or numbness.       Past Medical History  Past Medical History:   Diagnosis Date     Breast cancer (H)        Past Surgical History  Past Surgical History:   Procedure Laterality Date     AS MASTECTOMY, PARTIAL Left 2022       Family History  History reviewed. No pertinent family history.       Social History  Social History     Tobacco Use     Smoking status: Never     Smokeless tobacco: Never   Substance Use Topics     Alcohol use: Not Currently     Drug use: Never      Social History     Social History Narrative    Menarche - early teens    Currently perimenopausal     First at age 19. Breast fed all children about 8 months    OCPs for a few months    No HRT use    Works as a  and currently owns her own business        Current Medications:  Current Outpatient Medications   Medication Sig Dispense Refill     cholecalciferol (VITAMIN D3) 25 mcg (1000 units) capsule Take 1,000 Units by mouth       Multiple Vitamin (ONE-A-DAY ESSENTIAL) TABS Take 1 tablet by mouth daily       vitamin (B COMPLEX) capsule Take 1 capsule by mouth daily           ECOG Performance Status: 0    Physical Examination:  /75   Pulse 77   Temp 98  F (36.7  C) (Oral)   Resp 17   Ht 1.618 m (5' 3.7\")   Wt 71.5 kg (157 lb 9.6 oz)   LMP " 12/07/2022   SpO2 99%   BMI 27.31 kg/m    General:  Well appearing, well-nourished adult female in NAD.  HEENT:  Normocephalic.  Sclera anicteric.  MMM.  No lesions of the oropharynx.  Breast: Tenderness to palpation in left breast, but no palpable masses  Lymph:  No cervical, supraclavicular, or axillary LAD.  Chest:  CTA bilaterally.  No wheezes or crackles.  CV:  RRR.  No murmurs or gallops  Abd:  Soft/NT/ND.  BS normoactive.  No hepatosplenomegaly.  Ext:  No pitting edema of the bilateral lower extremities.  Pulses 2+ and symmetric.  Musculo:  Strength 5/5 throughout.  Neuro:  Cranial nerves grossly intact.  Psych:  Mood and affect appear normal.      Laboratory Data:  No results found for: WBC, HGB, HCT, MCV, PLT  No results found for: NA, HCO3, CREATININE, BUN, ANIONGAP, GLUCOSE  No results found for: ALT, AST, GGT, ALKPHOS, BILITOT  No results found for: INR, PT      Radiology data:  No results found.  I have personally reviewed the images of / or the following radiology data: Per oncology timeline    Pathology and other data:  I have personally reviewed the following data: Per oncology timeline      Assessment and Recommendations  Ilana Bishop  is a 52 year old perimenopausal woman with a recent diagnosis of palpation detected cT1cN0 ER(weakly positive), [place]/HER2 negative IDC of the left breast.  She underwent partial mastectomy with SLNB demonstrating pT1cN0 IDC with ER, [place], HER2 negative malignancy.  Due to discrepancy between her biopsy and final pathology results, she presents today for second opinion.     We had a lengthy discussion with the patient in which we reviewed her breast cancer diagnosis, workup and treatment history to date. We reviewed all of the relevant and available clinic notes,imaging, and pathology reports. We discussed the use of locoregional therapies to reduce risk of locoregional disease and the use of systemic therapies to primarily reduce risk of systemic disease. Informed her  that locoregional breast cancer is curable and that the goal would be to reduced her risk of developing systemic disease, which can be incurable.    Based on our pathologists review of her outside pathology, the biopsy done at [facility] is only weekly ER positive at 20% and the surgical specimen from [facility] is triple negative. We discussed heterogeneity of cancer cells at length and that weekly ER+ cancers tend to behave more like triple negative cancers. Although her cancer was early stage, I am concerned that she would have a high risk of recurrence based on the biology of her cancer. Discussed that our pathology team will repeat biomarker testing again given the discrepancy of the results she has so far.     I would recommend that she have adjuvant chemotherapy, even if ER is weakly positive. Ilana would like to avoid chemotherapy if at all possible. I discussed that mammaprint/blueprint testing can help us identify molecular features of her cancer, which I suspect is likely basal. She is agreeable to having this test done. If she declines systemic chemotherapy, one can consider adjuvant hormone therapy, though the benefit from this will likely be small. Since she only had a lumpectomy, she should also proceed with adjuvant RT.     In terms on long term monitoring, one can consider alternativing annual Dx mammo and MRI so that left breast is imaged q6months. We discussed why systemic imaging or blood work is not recommended for ongoing monitoring/screening of early stage breast cancers.     Finally, in light of her breast cancer diagnosis and lack of identifiable risk factors, I recommend that she undergo germline genetic testing. This may at times guide our treatment decisions and be helpful information for her family members to have.        RECOMMENDATIONS  - Repeat in-house biomarker testing - currently pending  - Mammaprint/blueprint testing on ER+ sample if enough tissue is available  - Germline genetic  testing  - Adjuvant chemotherapy   - Can consider alternative annual Dx mammo and MRI so that left breast is imaged q6months. No systemic imaging or blood work recommended for ongoing monitoring/screening         ADDENDUM 1/27/2023  Our pathologists were able to repeat biomarker testing on both the biopsy and surgical specimen. The biopsy specimen is read as ER (1+, 21-30%), SD (<1%), HER2 1+ on IHC. The surgical specimen was ER(<1%), SD(<1%), HER2 1+ on IHC.. I called and reviewed these results with Ilana again today. Reviewed that tumors can be heterogenous, but that weakly ER+ disease biologically behaves more like TNBC. I continue to recommend adjuvant chemotherapy. Molecular testing on the biopsy specimen (mammaprint with blueprint) can be considered to help provide more information for the patient regarding the biology of her disease. I suspect the mammaprint will be highrisk and the blueprint will be basal or luminal B subtype based on all of the clinical/histologic features available currently. She would prefer that additional testing be do by her primary oncologist. I will have our team fax the biopsy review over to them. All questions were answered for the patient. I am happy to see her at any time should additional questions arise.       Biopsy  Final Diagnosis   CASE FROM [place], [place] ([id number], OBTAINED 11/04/2022):  LEFT BREAST, 3:00, 10 CM FROM NIPPLE, ULTRASOUND-GUIDED CORE BIOPSY:  -INVASIVE DUCTAL CARCINOMA, [place] grade 3, at least 9 mm in linear extent.  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  -Invasive carcinoma is estrogen receptor positive (approx. 20% nuclei, weak staining), and negative for progesterone receptor and HER2 gene amplification by immunohistochemistry (score 1+).  -Ki-67 proliferation index is approximately 40% overall.   Addendum electronically signed by Luis Blackburn MD on 1/27/2023 at 12:35 PM   Electronically  signed by Luis Blackburn MD on 1/23/2023 at  9:06 AM   Original Case ID     [id number]   Material Submitted     8 slides   Synoptic Checklist   Breast Biomarker Reporting Template  Protocol posted: 6/22/2022  (Added in Addendum) BREAST: BIOMARKER REPORTING TEMPLATE - All Specimens  Test(s) Performed     Estrogen Receptor (ER) Status  Positive (greater than 10% of cells demonstrate nuclear positivity)    Percentage of Cells with Nuclear Positivity  21-30%    Average Intensity of Staining  Weak    Test Type  Food and Drug Administration (FDA) cleared (test / vendor): [place]    Primary Antibody  SP1    Scoring System  No separate scoring system used    Test(s) Performed     Progesterone Receptor (PgR) Status  Negative (less than 1%)      Internal control cells present and stain as expected    Test Type  Food and Drug Administration (FDA) cleared (test / vendor): [place]    Primary Antibody  1E2    Test(s) Performed     HER2 by Immunohistochemistry  Negative (Score 1+)    Test Type  Food and Drug Administration (FDA) cleared (test / vendor): [place]    Primary Antibody  4B5    Cold Ischemia and Fixation Times  Cannot be determined: outside block    Testing Performed on Block Number(s)  outside block A1    METHODS   Fixative  Formalin    Image Analysis  Not performed           Surgical specimen  Final Diagnosis   CASE FROM [place], [place] ([id number], OBTAINED 12/16/2022):  A. LEFT BREAST, PARTIAL MASTECTOMY:  - INVASIVE DUCTAL CARCINOMA, [place] grade 3, 1.5 cm in greatest dimension.  - Ductal carcinoma in-situ (DCIS), high nuclear grade, solid type.  - Margins are negative (see also part C-G for final margins).  - Invasive carcinoma is estrogen receptor negative, progesterone receptor negative and HER2/liyah negative/non-amplified (score 1+) by immunohistochemistry.  - AJCC pathologic staging is pT1c N0(sn)  - See outside synoptic report for details.     B. SENTINEL LYMPH NODE, LEFT AXILLARY,  EXCISION:  - Three lymph nodes, negative for malignancy (0/3).     C. LEFT BREAST, POSTERIOR MARGIN, RE-EXCISION:  - Negative for malignancy.     D. LEFT BREAST, SUPERIOR MARGIN, RE-EXCISION:  - Negative for malignancy.     E. LEFT BREAST, MEDIAL MARGIN, RE-EXCISION:  - Negative for malignancy.     F. LEFT BREAST, LATERAL MARGIN, RE-EXCISION:         - Negative for malignancy.     G. LEFT BREAST, ANTERIOR MARGIN, RE-EXCISION:  - Negative for malignancy.   Addendum electronically signed by Luis Blackburn MD on 1/26/2023 at  6:59 AM   Electronically signed by Luis Blackburn MD on 1/20/2023 at 12:01 PM   Original Case ID   UUMAMANGO   [id number]   Material Submitted   UUMAYO   38 slides   Synoptic Checklist   Breast Biomarker Reporting Template  Protocol posted: 6/22/2022  (Added in Addendum) BREAST: BIOMARKER REPORTING TEMPLATE - All Specimens  Test(s) Performed     Estrogen Receptor (ER) Status  Negative (less than 1%)      Internal control cells present and stain as expected    Test Type  Food and Drug Administration (FDA) cleared (test / vendor): [employer]    Primary Antibody  SP1    Scoring System  No separate scoring system used    Test(s) Performed     Progesterone Receptor (PgR) Status  Negative (less than 1%)      Internal control cells present and stain as expected    Test Type  Food and Drug Administration (FDA) cleared (test / vendor): [employer]    Primary Antibody  1E2    Scoring System  No separate scoring system used    Test(s) Performed     HER2 by Immunohistochemistry  Negative (Score 1+)    Test Type  Food and Drug Administration (FDA) cleared (test / vendor): [employer]    Primary Antibody  4B5    Cold Ischemia and Fixation Times  Cannot be determined: outside case    Testing Performed on Block Number(s)  outside block A6    METHODS   Fixative  Formalin    Image Analysis  Not performed    .            90 minutes spent on the date of the encounter doing chart review, review of outside records,  review of test results, interpretation of tests, patient visit, documentation and discussion with other provider(s)       Dame Celeste MD   of Medicine  Division of Hematology, Oncology and Transplantation  [facility]

## 2023-01-24 NOTE — NURSING NOTE
"Oncology Rooming Note    January 24, 2023 10:35 AM   Ilana Bishop is a 52 year old female who presents for:    Chief Complaint   Patient presents with     Oncology Clinic Visit     Breast cancer     Initial Vitals: /75   Pulse 77   Temp 98  F (36.7  C) (Oral)   Resp 17   Ht 1.618 m (5' 3.7\")   Wt 71.5 kg (157 lb 9.6 oz)   LMP 12/07/2022   SpO2 99%   BMI 27.31 kg/m   Estimated body mass index is 27.31 kg/m  as calculated from the following:    Height as of this encounter: 1.618 m (5' 3.7\").    Weight as of this encounter: 71.5 kg (157 lb 9.6 oz). Body surface area is 1.79 meters squared.  No Pain (0) Comment: Data Unavailable   Patient's last menstrual period was 12/07/2022.  Allergies reviewed: Yes  Medications reviewed: Yes    Medications: Medication refills not needed today.  Pharm[facility] into [facility]Things[id number] [address] [address] AT [place] [address] & [facility]    Clinical concerns: NONE       Susan Leonardo EMT            "

## 2023-01-24 NOTE — LETTER
1/24/2023         RE: Ilana Bishop  [id number]  [place] [address]        Dear Colleague,    Thank you for referring your patient, Ilana Bishop, to the [facility]. Please see a copy of my visit note below.    [facility]  Hematology/Oncology Consultation    Ilana Bishop MRN# [id number]   Age: 52 year old YOB: 1970       CC: Breast Cancer      HPI: Ilana Bishop is a 52 year old perimenopausal woman with recent diagnosis of palpation detected cT1c N0 ER (weakly positive), [place]/HER2 negative IDC of the left breast.  She underwent partial mastectomy with SLNB demonstrating pT1cN0 IDC with ER, TN, HER2 negative malignancy.  Due to discrepancy between her biopsy and final pathology results, she presents today for second opinion.     Her full oncologic history is as follows:   Oncologic History  Patient Active Problem List    Diagnosis Date Noted     Malignant neoplasm of overlapping sites of left breast in female, estrogen receptor positive (H) 11/18/2022     Priority: Medium     Left breast cancer  02/2022 left breast pain  4/26/2022 screening mammogram [facility]  10/2022 Continued to have pain in her left breast and left breast mass  11/04/2022 diagnostic mammogram of the left breast demonstrated scattered areas of fibroglandular density.  No suspicious mass or calcs were seen.  On ultrasound at 3:00 10 cm from the nipple, there was a 1.5 x 1.2 x 1.3 cm hypoechoic mass.  11/4/2022 ultrasound-guided biopsy ([facility]) grade 3 invasive ductal carcinoma.  ER(1+, 66%), PR0, HER2 0, Ki-67 44%.  Also grade 3 DCIS. N review showing grade 3 IDC, focal DCIS.  ER(1+, 20%), TN 0, HER2 1+, Ki-67 40%  12/1/2022 MRI breast no abnormalities in the right breast or right axilla.  Irregular heterogeneously enhancing mass in the left breast abutting the chest wall, measuring 1.8 x 1.5 x 1.2 cm.  Associated superficial chest wall enhancement.  No enlarged lymph nodes  identified in the left axilla.  No internal mammary lymphadenopathy.  2022 left breast partial mastectomy and SLNB ([facility]) 1.5cm of grade 3 IDC.  No LVI.  Margins negative.  0/3 lymph nodes with carcinoma.  ER 0, [place] 0, HER2 0. pT1cN0 3 mm of high-grade DCIS.   UMN review 1.5 cm grade 3 IDC, high-grade DCIS.  0 out of 3 lymph nodes with carcinoma.  Negative margins.  ER 0, [place] 0, HER2 1+.  pT1cN0.  Due to discrepancy in estrogen receptor expression by IHC between biopsy and surgical specimen, repeat biomarker testing being performed in-house.  Currently pending.          Interval History  Ilana is overall doing well but is understandably concerned regarding the discrepancy in the estrogen receptor between her biopsy and final pathology.  She is recovering well post surgery and has no concerns.    She denies headaches, visual changes, cough, SOB, chest pain, n/v, constipation/diarrhea, abdominal pain, focal weakness or numbness.       Past Medical History  Past Medical History:   Diagnosis Date     Breast cancer (H)        Past Surgical History  Past Surgical History:   Procedure Laterality Date     AS MASTECTOMY, PARTIAL Left 2022       Family History  History reviewed. No pertinent family history.       Social History  Social History     Tobacco Use     Smoking status: Never     Smokeless tobacco: Never   Substance Use Topics     Alcohol use: Not Currently     Drug use: Never      Social History     Social History Narrative    Menarche - early teens    Currently perimenopausal     First at age 19. Breast fed all children about 8 months    OCPs for a few months    No HRT use    Works as a  and currently owns her own business        Current Medications:  Current Outpatient Medications   Medication Sig Dispense Refill     cholecalciferol (VITAMIN D3) 25 mcg (1000 units) capsule Take 1,000 Units by mouth       Multiple Vitamin (ONE-A-DAY ESSENTIAL) TABS Take 1 tablet by mouth daily    "    vitamin (B COMPLEX) capsule Take 1 capsule by mouth daily           ECOG Performance Status: 0    Physical Examination:  /75   Pulse 77   Temp 98  F (36.7  C) (Oral)   Resp 17   Ht 1.618 m (5' 3.7\")   Wt 71.5 kg (157 lb 9.6 oz)   LMP 12/07/2022   SpO2 99%   BMI 27.31 kg/m    General:  Well appearing, well-nourished adult female in NAD.  HEENT:  Normocephalic.  Sclera anicteric.  MMM.  No lesions of the oropharynx.  Breast: Tenderness to palpation in left breast, but no palpable masses  Lymph:  No cervical, supraclavicular, or axillary LAD.  Chest:  CTA bilaterally.  No wheezes or crackles.  CV:  RRR.  No murmurs or gallops  Abd:  Soft/NT/ND.  BS normoactive.  No hepatosplenomegaly.  Ext:  No pitting edema of the bilateral lower extremities.  Pulses 2+ and symmetric.  Musculo:  Strength 5/5 throughout.  Neuro:  Cranial nerves grossly intact.  Psych:  Mood and affect appear normal.      Laboratory Data:  No results found for: WBC, HGB, HCT, MCV, PLT  No results found for: NA, HCO3, CREATININE, BUN, ANIONGAP, GLUCOSE  No results found for: ALT, AST, GGT, ALKPHOS, BILITOT  No results found for: INR, PT      Radiology data:  No results found.  I have personally reviewed the images of / or the following radiology data: Per oncology timeline    Pathology and other data:  I have personally reviewed the following data: Per oncology timeline      Assessment and Recommendations  Ilana Bishop  is a 52 year old perimenopausal woman with a recent diagnosis of palpation detected cT1cN0 ER(weakly positive), [place]/HER2 negative IDC of the left breast.  She underwent partial mastectomy with SLNB demonstrating pT1cN0 IDC with ER, [place], HER2 negative malignancy.  Due to discrepancy between her biopsy and final pathology results, she presents today for second opinion.     We had a lengthy discussion with the patient in which we reviewed her breast cancer diagnosis, workup and treatment history to date. We reviewed all of " the relevant and available clinic notes,imaging, and pathology reports. We discussed the use of locoregional therapies to reduce risk of locoregional disease and the use of systemic therapies to primarily reduce risk of systemic disease. Informed her that locoregional breast cancer is curable and that the goal would be to reduced her risk of developing systemic disease, which can be incurable.    Based on our pathologists review of her outside pathology, the biopsy done at [facility] is only weekly ER positive at 20% and the surgical specimen from [facility] is triple negative. We discussed heterogeneity of cancer cells at length and that weekly ER+ cancers tend to behave more like triple negative cancers. Although her cancer was early stage, I am concerned that she would have a high risk of recurrence based on the biology of her cancer. Discussed that our pathology team will repeat biomarker testing again given the discrepancy of the results she has so far.     I would recommend that she have adjuvant chemotherapy, even if ER is weakly positive. Ilana would like to avoid chemotherapy if at all possible. I discussed that mammaprint/blueprint testing can help us identify molecular features of her cancer, which I suspect is likely basal. She is agreeable to having this test done. If she declines systemic chemotherapy, one can consider adjuvant hormone therapy, though the benefit from this will likely be small. Since she only had a lumpectomy, she should also proceed with adjuvant RT.     In terms on long term monitoring, one can consider alternativing annual Dx mammo and MRI so that left breast is imaged q6months. We discussed why systemic imaging or blood work is not recommended for ongoing monitoring/screening of early stage breast cancers.     Finally, in light of her breast cancer diagnosis and lack of identifiable risk factors, I recommend that she undergo germline genetic testing. This may at times guide our treatment  decisions and be helpful information for her family members to have.        RECOMMENDATIONS  - Repeat in-house biomarker testing - currently pending  - Mammaprint/blueprint testing on ER+ sample if enough tissue is available  - Germline genetic testing  - Adjuvant chemotherapy   - Can consider alternative annual Dx mammo and MRI so that left breast is imaged q6months. No systemic imaging or blood work recommended for ongoing monitoring/screening         90 minutes spent on the date of the encounter doing chart review, review of outside records, review of test results, interpretation of tests, patient visit, documentation and discussion with other provider(s)       Dame Celeste MD   of Medicine  Division of Hematology, Oncology and Transplantation  [facility]                  Again, thank you for allowing me to participate in the care of your patient.        Sincerely,        Dame Celeste MD

## 2023-01-24 NOTE — LETTER
Date:January 25, 2023      Provider requested that no letter be sent. Do not send.       [facility]

## 2023-01-26 LAB
PATH REPORT.ADDENDUM SPEC: ABNORMAL
PATH REPORT.COMMENTS IMP SPEC: ABNORMAL
PATH REPORT.COMMENTS IMP SPEC: ABNORMAL
PATH REPORT.COMMENTS IMP SPEC: YES
PATH REPORT.FINAL DX SPEC: ABNORMAL
PATH REPORT.GROSS SPEC: ABNORMAL
PATH REPORT.MICROSCOPIC SPEC OTHER STN: ABNORMAL
PATH REPORT.RELEVANT HX SPEC: ABNORMAL
PATH REPORT.RELEVANT HX SPEC: ABNORMAL
PATH REPORT.SITE OF ORIGIN SPEC: ABNORMAL
PATHOLOGY SYNOPTIC REPORT: ABNORMAL

## 2023-01-27 ENCOUNTER — PATIENT OUTREACH (OUTPATIENT)
Dept: ONCOLOGY | Facility: CLINIC | Age: 53
End: 2023-01-27
Payer: COMMERCIAL

## 2023-01-27 LAB
PATH REPORT.COMMENTS IMP SPEC: ABNORMAL
PATH REPORT.COMMENTS IMP SPEC: ABNORMAL
PATH REPORT.COMMENTS IMP SPEC: YES
PATH REPORT.FINAL DX SPEC: ABNORMAL
PATH REPORT.GROSS SPEC: ABNORMAL
PATH REPORT.MICROSCOPIC SPEC OTHER STN: ABNORMAL
PATH REPORT.RELEVANT HX SPEC: ABNORMAL
PATH REPORT.RELEVANT HX SPEC: ABNORMAL
PATH REPORT.SITE OF ORIGIN SPEC: ABNORMAL
PATHOLOGY SYNOPTIC REPORT: ABNORMAL

## 2023-01-27 NOTE — PROGRESS NOTES
[facility]: Cancer Care                                                                                          Faxed pathology reports to Dr Javier Renteria at [facility]. [phone].    Signature:  Jyoti Funes RN

## 2024-05-28 ENCOUNTER — APPOINTMENT (OUTPATIENT)
Dept: URBAN - METROPOLITAN AREA CLINIC 253 | Age: 54
Setting detail: DERMATOLOGY
End: 2024-05-28

## 2024-05-28 VITALS — RESPIRATION RATE: 14 BRPM | HEIGHT: 60.75 IN | WEIGHT: 152 LBS

## 2024-05-28 DIAGNOSIS — H02.6 XANTHELASMA OF EYELID: ICD-10-CM

## 2024-05-28 DIAGNOSIS — L81.1 CHLOASMA: ICD-10-CM

## 2024-05-28 DIAGNOSIS — L57.8 OTHER SKIN CHANGES DUE TO CHRONIC EXPOSURE TO NONIONIZING RADIATION: ICD-10-CM

## 2024-05-28 PROBLEM — H02.64 XANTHELASMA OF LEFT UPPER EYELID: Status: ACTIVE | Noted: 2024-05-28

## 2024-05-28 PROCEDURE — OTHER PHOTO-DOCUMENTATION: OTHER

## 2024-05-28 PROCEDURE — OTHER COUNSELING: OTHER

## 2024-05-28 PROCEDURE — OTHER PRESCRIPTION: OTHER

## 2024-05-28 PROCEDURE — 99203 OFFICE O/P NEW LOW 30 MIN: CPT

## 2024-05-28 PROCEDURE — OTHER REASSURANCE: OTHER

## 2024-05-28 PROCEDURE — OTHER ADDITIONAL NOTES: OTHER

## 2024-05-28 PROCEDURE — OTHER MIPS QUALITY: OTHER

## 2024-05-28 RX ORDER — TRETINOIN 1 MG/G
0.1% CREAM TOPICAL QHS
Qty: 45 | Refills: 5 | Status: ERX | COMMUNITY
Start: 2024-05-28

## 2024-05-28 ASSESSMENT — LOCATION ZONE DERM
LOCATION ZONE: FACE
LOCATION ZONE: EYELID

## 2024-05-28 ASSESSMENT — LOCATION DETAILED DESCRIPTION DERM
LOCATION DETAILED: LEFT INFERIOR CENTRAL MALAR CHEEK
LOCATION DETAILED: LEFT MEDIAL SUPERIOR EYELID

## 2024-05-28 ASSESSMENT — LOCATION SIMPLE DESCRIPTION DERM
LOCATION SIMPLE: LEFT SUPERIOR EYELID
LOCATION SIMPLE: LEFT CHEEK

## 2024-05-28 NOTE — PROCEDURE: ADDITIONAL NOTES
Additional Notes: Discussed that this is not hazardous to her health, though there are cosmetic options for treatment. Today we discussed options for cautery here or laser treatment with Dr. Peterson. We will send a message to Dr. Peterson’s team referring her.
Detail Level: Simple
Render Risk Assessment In Note?: no
Additional Notes: She has tried hydroquinone and was not impressed, today we discussed option for tranexamic acid or laser therapy at the [facility]. Cosmetic brochure was provided. She will hold off on tranexamic acid for now though will let us know if she would like this Rx sent.

## 2024-05-28 NOTE — HPI: SKIN LESION
How Severe Is Your Skin Lesion?: mild
Has Your Skin Lesion Been Treated?: not been treated
Is This A New Presentation, Or A Follow-Up?: Skin Lesion
Additional History: There is an area of lightened skin on the medial left eyelid, this has been present for a year and is asymptomatic. She is also interested in refills of tretinoin 0.1% cream for anti-aging.